# Patient Record
Sex: MALE | Race: WHITE | NOT HISPANIC OR LATINO | URBAN - METROPOLITAN AREA
[De-identification: names, ages, dates, MRNs, and addresses within clinical notes are randomized per-mention and may not be internally consistent; named-entity substitution may affect disease eponyms.]

---

## 2020-07-23 ENCOUNTER — EMERGENCY (EMERGENCY)
Facility: HOSPITAL | Age: 20
LOS: 1 days | Discharge: HOME | End: 2020-07-23
Attending: EMERGENCY MEDICINE | Admitting: EMERGENCY MEDICINE
Payer: OTHER MISCELLANEOUS

## 2020-07-23 VITALS
TEMPERATURE: 98 F | HEART RATE: 109 BPM | OXYGEN SATURATION: 99 % | DIASTOLIC BLOOD PRESSURE: 86 MMHG | WEIGHT: 179.9 LBS | RESPIRATION RATE: 17 BRPM | SYSTOLIC BLOOD PRESSURE: 145 MMHG | HEIGHT: 73 IN

## 2020-07-23 PROCEDURE — 73562 X-RAY EXAM OF KNEE 3: CPT | Mod: 26,RT

## 2020-07-23 PROCEDURE — 73080 X-RAY EXAM OF ELBOW: CPT | Mod: 26,RT

## 2020-07-23 PROCEDURE — 99284 EMERGENCY DEPT VISIT MOD MDM: CPT | Mod: 25

## 2020-07-23 RX ORDER — IBUPROFEN 200 MG
600 TABLET ORAL ONCE
Refills: 0 | Status: COMPLETED | OUTPATIENT
Start: 2020-07-23 | End: 2020-07-23

## 2020-07-23 RX ADMIN — Medication 600 MILLIGRAM(S): at 01:52
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
## 2020-07-23 NOTE — ED PROVIDER NOTE - PATIENT PORTAL LINK FT
You can access the FollowMyHealth Patient Portal offered by [facility] by registering at the following website: [email or web address]. By joining [facility]’s FollowMyHealth portal, you will also be able to view your health information using other applications (apps) compatible with our system.

## 2020-07-23 NOTE — ED PROVIDER NOTE - DIAGNOSTIC INTERPRETATION
Interpreted by ED physician  Knee R x-ray, 3 views  No fracture, no dislocation (joint spaces grossly normal), no Foreign Body noted, soft tissue normal  Interpreted by ED physician  Elbow R x-ray, 3 views  No fracture, no dislocation (joint spaces grossly normal), no Foreign Body noted, soft tissue normal
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
## 2020-07-23 NOTE — ED ADULT TRIAGE NOTE - BSA (M2)
PATIENT WIFE CALLED IN AND SHE WOULD LIKE TO KNOW IF PATIENT COULD BE APPROVED DRY NEEDLING. HOW MANY VISITS WOULD HE BE ABLE TO HAVE.  PLEASE CALL [address]  [phone] 2.06

## 2020-07-23 NOTE — ED ADULT NURSE NOTE - SUICIDE SCREENING QUESTION 2
Pt's HR elevated and RR 26  PA made aware  Pain medicine and benadryl ordered, Pt maintained on tele & 2Lnc pulse ox 95%  Will cont to monitor  No

## 2020-07-23 NOTE — ED PROVIDER NOTE - CARE PROVIDER_API CALL
Jabari Hargrove  ORTHOPAEDIC SURGERY  [address] [place] [address]  Phone: [phone]  Fax: [phone]  Follow Up Time:

## 2020-07-23 NOTE — ED PROVIDER NOTE - CARE PLAN
Principal Discharge DX:	Contusion of right knee, initial encounter  Secondary Diagnosis:	Contusion of right elbow, initial encounter
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
## 2020-07-27 DIAGNOSIS — M25.561 PAIN IN RIGHT KNEE: ICD-10-CM
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
## 2020-07-27 DIAGNOSIS — S80.01XA CONTUSION OF RIGHT KNEE, INITIAL ENCOUNTER: ICD-10-CM
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
## 2020-07-27 DIAGNOSIS — S50.01XA CONTUSION OF RIGHT ELBOW, INITIAL ENCOUNTER: ICD-10-CM
